# Patient Record
Sex: FEMALE | Race: WHITE | NOT HISPANIC OR LATINO | URBAN - METROPOLITAN AREA
[De-identification: names, ages, dates, MRNs, and addresses within clinical notes are randomized per-mention and may not be internally consistent; named-entity substitution may affect disease eponyms.]

---

## 2018-06-04 ENCOUNTER — OUTPATIENT (OUTPATIENT)
Dept: OUTPATIENT SERVICES | Facility: HOSPITAL | Age: 63
LOS: 1 days | End: 2018-06-04

## 2020-08-31 ENCOUNTER — APPOINTMENT (OUTPATIENT)
Dept: RADIOLOGY | Facility: CLINIC | Age: 65
End: 2020-08-31
Payer: COMMERCIAL

## 2020-08-31 PROCEDURE — 77080 DXA BONE DENSITY AXIAL: CPT

## 2021-01-11 ENCOUNTER — OUTPATIENT (OUTPATIENT)
Dept: OUTPATIENT SERVICES | Facility: HOSPITAL | Age: 66
LOS: 1 days | End: 2021-01-11

## 2021-11-03 PROBLEM — Z00.00 ENCOUNTER FOR PREVENTIVE HEALTH EXAMINATION: Status: ACTIVE | Noted: 2021-11-03

## 2022-01-26 ENCOUNTER — APPOINTMENT (OUTPATIENT)
Dept: OPHTHALMOLOGY | Facility: CLINIC | Age: 67
End: 2022-01-26
Payer: COMMERCIAL

## 2022-01-26 ENCOUNTER — NON-APPOINTMENT (OUTPATIENT)
Age: 67
End: 2022-01-26

## 2022-01-26 PROCEDURE — 92014 COMPRE OPH EXAM EST PT 1/>: CPT

## 2022-01-26 PROCEDURE — 92134 CPTRZ OPH DX IMG PST SGM RTA: CPT

## 2022-02-23 ENCOUNTER — APPOINTMENT (OUTPATIENT)
Dept: OPHTHALMOLOGY | Facility: CLINIC | Age: 67
End: 2022-02-23
Payer: COMMERCIAL

## 2022-02-23 ENCOUNTER — NON-APPOINTMENT (OUTPATIENT)
Age: 67
End: 2022-02-23

## 2022-02-23 PROCEDURE — 92133 CPTRZD OPH DX IMG PST SGM ON: CPT

## 2022-02-23 PROCEDURE — 92083 EXTENDED VISUAL FIELD XM: CPT

## 2022-05-04 ENCOUNTER — APPOINTMENT (OUTPATIENT)
Dept: OPHTHALMOLOGY | Facility: CLINIC | Age: 67
End: 2022-05-04

## 2022-07-15 ENCOUNTER — APPOINTMENT (OUTPATIENT)
Dept: ULTRASOUND IMAGING | Facility: CLINIC | Age: 67
End: 2022-07-15

## 2022-07-15 PROCEDURE — 76536 US EXAM OF HEAD AND NECK: CPT

## 2022-07-19 ENCOUNTER — APPOINTMENT (OUTPATIENT)
Dept: PLASTIC SURGERY | Facility: CLINIC | Age: 67
End: 2022-07-19

## 2022-07-19 VITALS
OXYGEN SATURATION: 99 % | RESPIRATION RATE: 16 BRPM | SYSTOLIC BLOOD PRESSURE: 110 MMHG | TEMPERATURE: 97.6 F | BODY MASS INDEX: 28.12 KG/M2 | WEIGHT: 175 LBS | HEART RATE: 65 BPM | HEIGHT: 66 IN | DIASTOLIC BLOOD PRESSURE: 80 MMHG

## 2022-07-19 DIAGNOSIS — Z78.9 OTHER SPECIFIED HEALTH STATUS: ICD-10-CM

## 2022-07-19 DIAGNOSIS — Z87.891 PERSONAL HISTORY OF NICOTINE DEPENDENCE: ICD-10-CM

## 2022-07-19 DIAGNOSIS — J45.909 UNSPECIFIED ASTHMA, UNCOMPLICATED: ICD-10-CM

## 2022-07-19 DIAGNOSIS — Z87.19 PERSONAL HISTORY OF OTHER DISEASES OF THE DIGESTIVE SYSTEM: ICD-10-CM

## 2022-07-19 DIAGNOSIS — Z82.3 FAMILY HISTORY OF STROKE: ICD-10-CM

## 2022-07-19 DIAGNOSIS — Z82.49 FAMILY HISTORY OF ISCHEMIC HEART DISEASE AND OTHER DISEASES OF THE CIRCULATORY SYSTEM: ICD-10-CM

## 2022-07-19 DIAGNOSIS — Z82.0 FAMILY HISTORY OF EPILEPSY AND OTHER DISEASES OF THE NERVOUS SYSTEM: ICD-10-CM

## 2022-07-19 DIAGNOSIS — Z81.8 FAMILY HISTORY OF OTHER MENTAL AND BEHAVIORAL DISORDERS: ICD-10-CM

## 2022-07-19 DIAGNOSIS — Z80.3 FAMILY HISTORY OF MALIGNANT NEOPLASM OF BREAST: ICD-10-CM

## 2022-07-19 DIAGNOSIS — Z86.19 PERSONAL HISTORY OF OTHER INFECTIOUS AND PARASITIC DISEASES: ICD-10-CM

## 2022-07-19 DIAGNOSIS — Z63.5 DISRUPTION OF FAMILY BY SEPARATION AND DIVORCE: ICD-10-CM

## 2022-07-19 DIAGNOSIS — Z80.42 FAMILY HISTORY OF MALIGNANT NEOPLASM OF PROSTATE: ICD-10-CM

## 2022-07-19 PROCEDURE — 99205 OFFICE O/P NEW HI 60 MIN: CPT

## 2022-07-19 PROCEDURE — G2212 PROLONG OUTPT/OFFICE VIS: CPT

## 2022-07-19 RX ORDER — ALBUTEROL SULFATE 90 UG/1
108 (90 BASE) INHALANT RESPIRATORY (INHALATION)
Refills: 0 | Status: ACTIVE | COMMUNITY

## 2022-07-19 RX ORDER — OXYCODONE AND ACETAMINOPHEN 5; 325 MG/1; MG/1
5-325 TABLET ORAL
Qty: 10 | Refills: 0 | Status: COMPLETED | COMMUNITY
Start: 2022-05-04

## 2022-07-19 RX ORDER — BACLOFEN 10 MG/1
10 TABLET ORAL
Qty: 90 | Refills: 0 | Status: ACTIVE | COMMUNITY
Start: 2022-05-16

## 2022-07-19 RX ORDER — OMEPRAZOLE 40 MG/1
40 CAPSULE, DELAYED RELEASE ORAL
Qty: 90 | Refills: 0 | Status: ACTIVE | COMMUNITY
Start: 2022-05-16

## 2022-07-19 RX ORDER — LINACLOTIDE 290 UG/1
290 CAPSULE, GELATIN COATED ORAL
Refills: 0 | Status: ACTIVE | COMMUNITY

## 2022-07-19 RX ORDER — LETROZOLE TABLETS 2.5 MG/1
2.5 TABLET, FILM COATED ORAL
Refills: 0 | Status: ACTIVE | COMMUNITY

## 2022-07-19 RX ORDER — AZELASTINE HYDROCHLORIDE 137 UG/1
0.1 SPRAY, METERED NASAL
Refills: 0 | Status: ACTIVE | COMMUNITY

## 2022-07-19 RX ORDER — SPIRONOLACTONE 25 MG/1
25 TABLET ORAL
Qty: 90 | Refills: 0 | Status: ACTIVE | COMMUNITY
Start: 2022-05-16

## 2022-07-19 RX ORDER — CLOTRIMAZOLE 10 MG/G
CREAM VAGINAL
Refills: 0 | Status: ACTIVE | COMMUNITY

## 2022-07-19 RX ORDER — CEFADROXIL 500 MG/1
500 CAPSULE ORAL
Qty: 14 | Refills: 0 | Status: COMPLETED | COMMUNITY
Start: 2022-04-26

## 2022-07-19 RX ORDER — TRAMADOL HYDROCHLORIDE 50 MG/1
50 TABLET, COATED ORAL
Qty: 10 | Refills: 0 | Status: COMPLETED | COMMUNITY
Start: 2022-04-26

## 2022-07-19 RX ORDER — MUPIROCIN 20 MG/G
2 OINTMENT TOPICAL
Refills: 0 | Status: ACTIVE | COMMUNITY

## 2022-07-19 RX ORDER — CYCLOBENZAPRINE HYDROCHLORIDE 5 MG/1
5 TABLET, FILM COATED ORAL
Refills: 0 | Status: ACTIVE | COMMUNITY

## 2022-07-19 RX ORDER — MOMETASONE 50 UG/1
SPRAY, METERED NASAL
Refills: 0 | Status: ACTIVE | COMMUNITY

## 2022-07-19 RX ORDER — FLUTICASONE PROPIONATE 50 UG/1
50 SPRAY, METERED NASAL
Qty: 16 | Refills: 0 | Status: ACTIVE | COMMUNITY
Start: 2022-07-08

## 2022-07-19 RX ORDER — PROMETHAZINE HYDROCHLORIDE AND CODEINE PHOSPHATE 6.25; 1 MG/5ML; MG/5ML
6.25-1 SOLUTION ORAL
Qty: 473 | Refills: 0 | Status: COMPLETED | COMMUNITY
Start: 2022-07-12

## 2022-07-19 SDOH — SOCIAL STABILITY - SOCIAL INSECURITY: DISRUPTION OF FAMILY BY SEPARATION AND DIVORCE: Z63.5

## 2022-07-29 NOTE — ASSESSMENT
[FreeTextEntry1] : 66 yo woman who presents after prior lumpectomy and radiation then bilateral mastectomy and implant reconstruction with asymmetry and bilateral deformity. She presents for evaluation for reconstructive options.\par \par We reviewed the spectrum of reconstructive options, including no reconstruction, implant based reconstruction and autologous reconstruction. We discussed the risk/benefit ratio for each of these options.\par \par We reviewed options for addition revision of implant reconstruction, including additional fat grafting and implant exchanges. This course will be limited by the radiation on the left and scarring from tissue necrosis and LD flap on the right. We discussed that implants are not considered lifetime devices, and may need to be replaced in the future. We discussed the risks of infection requiring implant removal, rupture, capsular contracture and implant exposure (especially following radiation).\par \par We discussed at length the R/B/A of autologous reconstruction from the abdomen of the thigh, including, but not limited to complete flap failure, scarring, poor wound healing, bleeding/hematoma, infection, return to OR for attempted salvage. She is aware that abdominal weakness is also a possibility, and she would like to avoid abdominal surgery for this reason. We then pursued additional discussion of the use of bilateral PAP flaps.\par I will discuss this with my colleagues as well, because this surgery will require a second microvascular surgeon.\par She should return for a second consultation to review these options.

## 2022-07-29 NOTE — REASON FOR VISIT
[Consultation] : a consultation visit [FreeTextEntry1] : Patient states she wants a second option for her breast surgery.

## 2022-07-29 NOTE — PHYSICAL EXAM
[NI] : Normal [de-identified] : NL respiratory effort noted  [de-identified] : Bilateral reconstructed breasts with implants in place.\par Capsules are soft.\par Right recon is shifted laterally.\par Both sides appear narrow on her chest wall.\par SQ covered is thin.\par Skin is tighter on the left, radiated side.\par Lower pole is scarred on the right and there is a small dog ear at the lateral aspect of the LD flap.\par Right bra line back scar for LD harvest. [de-identified] : No breasts, s/p bilateral mastectomy.  [de-identified] : Excellent muscle tone.\par Mild adipose and moderate skin excess. [de-identified] : Medial thigh excess skin and adipose. [de-identified] : Dark tan

## 2022-07-29 NOTE — HISTORY OF PRESENT ILLNESS
[FreeTextEntry1] : This is a 68 yo woman who presents for evaluation for reconstructive options after bilateral mastectomy.\par SHe has a history of left lumpectomy sentinel lymph node biopsy as well as postoperative radiation about 17 years ago (approx 2005). This was performed by a Dr. Bib Sanders. She reports she initially felt a mass that lead to the workup for this initial cancer.\par She did have some firmness of the left breast, but otherwise did well until last year when she again felt a mass in the lateral aspect of the left breast. She had already planned a mammogram around that time for surveillance, and was found to have a suspicious lesion on MMG and US. Biopsy again revealed cancer. This time, she opted for bilateral nipple sparing mastectomy (right prophylactic) by Dr. Bergman (she reports she had to change breast surgeons due to insurance).\par Her postoperative course was complicated by inferior pole skin necrosis on the right side and she was taken back to the OR for resection of the necrotic skin and replacement with a flap from her back. 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 The right and left nipples were then resected. \par She had another procedure for right chest wall scar revision and a subsequent procedure for right implant exchange (pt is not sure why). \par In May of this year, she underwent fat grafting from her abdomen to the bilateral reconstructed breasts, but reports that she did not see any improvement.\par \par Of note in her history, she had bilateral breast augmentation by Dr. Santa Hansen about one year before her first cancer diagnosis (approx 2004) and she was very happy with her shape and size at that time (submuscular saline aug 330cc).

## 2022-09-08 ENCOUNTER — APPOINTMENT (OUTPATIENT)
Dept: RADIOLOGY | Facility: CLINIC | Age: 67
End: 2022-09-08

## 2022-09-08 PROCEDURE — 77080 DXA BONE DENSITY AXIAL: CPT

## 2022-09-20 ENCOUNTER — APPOINTMENT (OUTPATIENT)
Dept: PLASTIC SURGERY | Facility: CLINIC | Age: 67
End: 2022-09-20

## 2022-09-20 VITALS
SYSTOLIC BLOOD PRESSURE: 102 MMHG | BODY MASS INDEX: 22.13 KG/M2 | HEART RATE: 65 BPM | OXYGEN SATURATION: 98 % | TEMPERATURE: 97.2 F | HEIGHT: 68 IN | DIASTOLIC BLOOD PRESSURE: 74 MMHG | RESPIRATION RATE: 16 BRPM | WEIGHT: 146 LBS

## 2022-09-20 PROCEDURE — 99215 OFFICE O/P EST HI 40 MIN: CPT

## 2022-09-20 NOTE — ASSESSMENT
[FreeTextEntry1] : 66 yo woman who presents after prior lumpectomy and radiation then bilateral mastectomy and implant reconstruction with asymmetry and bilateral deformity. She presents for a second consultation regarding reconstructive options especially the thigh donor site or PAP flap..\par \par \par We discussed at length the R/B/A of autologous reconstruction from the thigh, including, but not limited to complete flap failure, scarring, poor wound healing, bleeding/hematoma, infection, return to OR for attempted salvage.We discussed the need for MR angiography to evaluate the suitability of the donor sites with regard to the perforating vessels. We discussed the volume that could be obtained with these flaps and the possibility of adding a small implant under the flap to add volume. We discussed the need for a backup plan in the case of flap failure, specifically placement of tissue expander(s).\par We discussed the possibility of additional surgery for fine-tuning of the result, especially fat grafting for any remaining step-off deformities.\par We discussed the limitations of breast reconstruction, especially in the setting of multiple previous surgeries and radiation, and that the reconstructed breasts will not be expected to look and feel like her augmented breasts did prior to mastectomy.\par We discussed scarring and the likelihood of adding a skin paddle on the left or bilaterally, due to prior radiation (left) and scarring (right).\par We discussed the need for scheduling with a second microvascular surgeon and the inability to  confirm that the surgery could be scheduled in time for her to recover prior to her planned travel to [place] on 1/11/23.

## 2022-09-20 NOTE — HISTORY OF PRESENT ILLNESS
[FreeTextEntry1] : Pt returns for a second consultation.\par She reiterates that she is not happy with her results following her reconstructive surgery.\par When pressed for more specifics, she reports that the implant edges are visible, that the implants are too far apart (too lateral) and that the flap tissue on the right side bulges out. She is not sure what her current implant volume is. She believes her original saline implants in 2004 were around 300cc.\par Ideally, she would like her reconstructed breast size to be a bit larger than they are now.

## 2022-09-20 NOTE — REVIEW OF SYSTEMS
[Negative] : Cardiovascular [FreeTextEntry9] : Back pain. Due for MRI on 10/5 to evaluate for spinal stenosis.

## 2022-09-20 NOTE — PHYSICAL EXAM
[NI] : Normal [de-identified] : NL respiratory effort noted  [de-identified] : Bilateral reconstructed breasts with implants in place.\par Capsules are soft.\par Right recon is shifted laterally.\par Both sides appear narrow on her chest wall.\par SQ coverage is thin.\par Skin is tighter on the left, radiated side.\par Lower pole is scarred on the right and there is a small dog ear at the supero-lateral aspect of the LD flap.\par Right bra line back scar from LD harvest. [de-identified] : No breasts, s/p bilateral mastectomy.  [de-identified] : Excellent muscle tone.\par Mild adipose and moderate skin excess. [de-identified] : Medial thigh excess skin and adipose. [de-identified] : Dark tan

## 2022-10-05 ENCOUNTER — APPOINTMENT (OUTPATIENT)
Dept: MRI IMAGING | Facility: CLINIC | Age: 67
End: 2022-10-05

## 2022-10-05 PROCEDURE — 72148 MRI LUMBAR SPINE W/O DYE: CPT

## 2022-10-11 ENCOUNTER — APPOINTMENT (OUTPATIENT)
Dept: MRI IMAGING | Facility: CLINIC | Age: 67
End: 2022-10-11
Payer: MEDICARE

## 2022-10-11 ENCOUNTER — OUTPATIENT (OUTPATIENT)
Dept: OUTPATIENT SERVICES | Facility: HOSPITAL | Age: 67
LOS: 1 days | End: 2022-10-11
Payer: MEDICARE

## 2022-10-11 DIAGNOSIS — Z42.1 ENCOUNTER FOR BREAST RECONSTRUCTION FOLLOWING MASTECTOMY: ICD-10-CM

## 2022-10-11 PROCEDURE — A9585: CPT

## 2022-10-11 PROCEDURE — C8914: CPT

## 2022-10-11 PROCEDURE — 73725 MR ANG LWR EXT W OR W/O DYE: CPT | Mod: 26,50

## 2022-10-13 ENCOUNTER — NON-APPOINTMENT (OUTPATIENT)
Age: 67
End: 2022-10-13

## 2022-11-04 ENCOUNTER — NON-APPOINTMENT (OUTPATIENT)
Age: 67
End: 2022-11-04

## 2022-11-07 ENCOUNTER — APPOINTMENT (OUTPATIENT)
Dept: PLASTIC SURGERY | Facility: CLINIC | Age: 67
End: 2022-11-07

## 2022-11-07 VITALS
DIASTOLIC BLOOD PRESSURE: 78 MMHG | HEIGHT: 68 IN | SYSTOLIC BLOOD PRESSURE: 100 MMHG | RESPIRATION RATE: 16 BRPM | TEMPERATURE: 97.3 F | BODY MASS INDEX: 23.34 KG/M2 | HEART RATE: 61 BPM | WEIGHT: 154 LBS | OXYGEN SATURATION: 99 %

## 2022-11-07 DIAGNOSIS — N65.0 DEFORMITY OF RECONSTRUCTED BREAST: ICD-10-CM

## 2022-11-07 DIAGNOSIS — Z85.3 PERSONAL HISTORY OF MALIGNANT NEOPLASM OF BREAST: ICD-10-CM

## 2022-11-07 PROCEDURE — 99215 OFFICE O/P EST HI 40 MIN: CPT

## 2022-11-07 NOTE — ASSESSMENT
[FreeTextEntry1] : We discussed the difficulty of finding enough tissue to perform adequate autologous flap reconstruction without adding undue complexity and limiting the number of microsurgeons available to perform those procedures (may require travel). Because of this, she is willing to consider revision of the reconstruction with implants, capsule work and fat grafting.\par She is also due for a left breast and axilla US today as her PCP found a small lump in her axilla. Clinically, this is consistent with an inclusion cyst. She will point out the firm area on the reconstructed breast as well, so that it can be assessed with US.\par The plan would be for the following:\par -Left capsulotomy with possible excision of the firm UOQ area, pending the US results.\par -Right medial capsulotomy and lateral capsulorrhaphy to medialize the implant.\par -Bilateral implant exchange for a wider base width to accommodate her chest wall.\par -Bilateral fat grafting to address upper pole defects and medial soft tissue deficit bilaterally.\par -Excision of right UOQ skin and fat excess.\par \par We reviewed the risks, benefits and alternatives to fat grafting, including, but not limited to, infection of the implant requiring removal, fat necrosis resembling a breast mass and oil cysts. We also reviewed the soreness and bruising expected at the fat harvest (liposuction) sites. We reviewed the possibility of contour deformity at the harvest sites. I also discussed the timeline for fat graft take, and that it will take 2-3 months to see how much of the fat will live in its new location. Additional sessions of fat grafting may be required.\par \par We discussed implant risks and went over the [place] silicone implant consent checklist page by page. In summary, we discussed that implants are not lifetime devices. There are risks of capsular contracture (higher after radiation), implant rupture, breast implant associated lymphoma (JOSETTE-ALCL), infection requiring removal and poor position and/or contour. We discussed systemic symptoms that may be associated with breast implants that may or may not resolve with removal. \par \par She was given an opportunity to ask questions, and all of her questions were answered. She wishes to proceed.\par I offered her a surgical date in late November, but she reports she is hosting a party that day and cannot change the date. We will let her know what dates we may have available in December.

## 2022-11-07 NOTE — PHYSICAL EXAM
[NI] : Normal [de-identified] : NL respiratory effort noted  [de-identified] : Bilateral reconstructed breasts with implants in place.\par Capsules are soft, except for one area in the UOQ on the left. There is a firm, disc-like mass about 3.5 x 2.5 cm with well-defined edges.\par It is not tender and is very flat.\par Right recon is shifted laterally.\par Both sides appear narrow on her chest wall.\par SQ coverage is thin.\par Skin is tighter on the left, radiated side.\par Lower pole is scarred on the right and there is a small dog ear at the supero-lateral aspect of the LD flap.\par Right bra line back scar from LD harvest.\par Bilateral upper pole soft tissue deficit creating a step-off deformity. [de-identified] : No breasts, s/p bilateral mastectomy. \par Left axilla with a 4 mm superficial lump, clinically suspicious for an inclusion cyst. [de-identified] : Excellent muscle tone.\par Mild adipose and moderate skin excess. [de-identified] : Medial thigh excess skin and adipose. [de-identified] : Dark tan

## 2022-11-07 NOTE — HISTORY OF PRESENT ILLNESS
[FreeTextEntry1] : Pt presents to discuss other, non-flap, surgical options for revision of her breast reconstruction.\par She would be OK with the left side, especially is more fat grafting could be performed medially, but she is most upset about the lateral displacement of the right implant and the soft tissue excess on the lateral aspect on the right.\par She has also noticed a flat, hard area in the left reconstructed breast.

## 2022-11-07 NOTE — REASON FOR VISIT
[Follow-Up: _____] : a [unfilled] follow-up visit [FreeTextEntry1] : Patient states she feels good, she would like to discuss about options for surgery.

## 2022-11-14 ENCOUNTER — NON-APPOINTMENT (OUTPATIENT)
Age: 67
End: 2022-11-14

## 2022-11-25 ENCOUNTER — APPOINTMENT (OUTPATIENT)
Dept: MRI IMAGING | Facility: CLINIC | Age: 67
End: 2022-11-25

## 2022-11-25 PROCEDURE — A9585: CPT

## 2022-11-25 PROCEDURE — 77049 MRI BREAST C-+ W/CAD BI: CPT

## 2022-12-03 ENCOUNTER — NON-APPOINTMENT (OUTPATIENT)
Age: 67
End: 2022-12-03

## 2022-12-23 ENCOUNTER — NON-APPOINTMENT (OUTPATIENT)
Age: 67
End: 2022-12-23

## 2022-12-23 ENCOUNTER — APPOINTMENT (OUTPATIENT)
Dept: PLASTIC SURGERY | Facility: HOSPITAL | Age: 67
End: 2022-12-23
Payer: MEDICARE

## 2022-12-23 PROCEDURE — ZZZZZ: CPT

## 2022-12-23 PROCEDURE — 88300 SURGICAL PATH GROSS: CPT | Mod: 26,59

## 2022-12-23 PROCEDURE — 88305 TISSUE EXAM BY PATHOLOGIST: CPT | Mod: 26

## 2022-12-23 PROCEDURE — 88302 TISSUE EXAM BY PATHOLOGIST: CPT | Mod: 26

## 2022-12-27 ENCOUNTER — NON-APPOINTMENT (OUTPATIENT)
Age: 67
End: 2022-12-27

## 2022-12-28 ENCOUNTER — APPOINTMENT (OUTPATIENT)
Dept: PLASTIC SURGERY | Facility: CLINIC | Age: 67
End: 2022-12-28

## 2022-12-28 VITALS
HEART RATE: 90 BPM | OXYGEN SATURATION: 100 % | SYSTOLIC BLOOD PRESSURE: 118 MMHG | RESPIRATION RATE: 16 BRPM | BODY MASS INDEX: 23.34 KG/M2 | HEIGHT: 68 IN | DIASTOLIC BLOOD PRESSURE: 80 MMHG | WEIGHT: 154 LBS | TEMPERATURE: 98.2 F

## 2022-12-28 PROCEDURE — 99024 POSTOP FOLLOW-UP VISIT: CPT

## 2022-12-28 NOTE — HISTORY OF PRESENT ILLNESS
[FreeTextEntry1] : Pt reports she is very happy with the results, especially the size and superior pole fullness.\par Had some irritation around the tegaderm at a few sites, especially under the breasts.

## 2022-12-28 NOTE — ASSESSMENT
[FreeTextEntry1] : Doing well postop.\par continue abx until finished.\par I cautioned pt that she still has swelling and the upper pole fullness will not be maintained to this degree.\par \par She is planning on leaving for [place] on 1/11 (despite caution preoperatively that this may be too soon for proper follow up), but will be back the last week of February. I have advised her to follow up when she returns, but to let me know of any changes in the meantime and that if there are any issues, she may have to change her plans.\par

## 2022-12-28 NOTE — PHYSICAL EXAM
[NI] : Normal [de-identified] : NL respiratory effort noted  [de-identified] : Bilateral recon breasts with edema and mild ecchymosis.\par Incisions intact with steristrips in place.\par Mild rash/dermatitis at both IMFs. [de-identified] : No breasts, s/p bilateral mastectomy.  [de-identified] : Bilateral flank ecchymosis [de-identified] : Bilateral lateral thigh ecchymosis.\par Minimal to no medial thigh ecchymosis.

## 2022-12-28 NOTE — REASON FOR VISIT
[Follow-Up: _____] : a [unfilled] follow-up visit [FreeTextEntry1] : Patient states she is feeling sore which is expected.

## 2023-01-13 ENCOUNTER — NON-APPOINTMENT (OUTPATIENT)
Age: 68
End: 2023-01-13

## 2023-02-21 ENCOUNTER — APPOINTMENT (OUTPATIENT)
Dept: PLASTIC SURGERY | Facility: CLINIC | Age: 68
End: 2023-02-21
Payer: MEDICARE

## 2023-02-21 VITALS
RESPIRATION RATE: 16 BRPM | WEIGHT: 154 LBS | DIASTOLIC BLOOD PRESSURE: 80 MMHG | HEIGHT: 68 IN | BODY MASS INDEX: 23.34 KG/M2 | HEART RATE: 79 BPM | SYSTOLIC BLOOD PRESSURE: 100 MMHG | TEMPERATURE: 98 F | OXYGEN SATURATION: 98 %

## 2023-02-21 DIAGNOSIS — Z09 ENCOUNTER FOR FOLLOW-UP EXAMINATION AFTER COMPLETED TREATMENT FOR CONDITIONS OTHER THAN MALIGNANT NEOPLASM: ICD-10-CM

## 2023-02-21 PROCEDURE — 99024 POSTOP FOLLOW-UP VISIT: CPT

## 2023-02-21 RX ORDER — CEPHALEXIN 500 MG/1
500 TABLET ORAL 3 TIMES DAILY
Qty: 21 | Refills: 0 | Status: DISCONTINUED | COMMUNITY
Start: 2022-12-15 | End: 2023-02-21

## 2023-02-21 RX ORDER — OXYCODONE 5 MG/1
5 TABLET ORAL EVERY 6 HOURS
Qty: 5 | Refills: 0 | Status: DISCONTINUED | COMMUNITY
Start: 2022-12-15 | End: 2023-02-21

## 2023-02-21 NOTE — ASSESSMENT
[FreeTextEntry1] : Doing well s/p implant exchange, revision of recon and fat grafting.\par Will plan NAC recon next.\par I advised pt that the left breast skin is stiff from the radiation and the coverage is still fairly thin, so creation of nipple flaps for projection may be problematic on that side.\par She has opted for a tattoo only reconstruction, with just the visual representation of the nipple within the tattooed areola.\par She is leaving again for [place] in March, so we will plan for sometime in May.

## 2023-02-21 NOTE — PHYSICAL EXAM
[NI] : Normal [de-identified] : NL respiratory effort noted  [de-identified] : Bilateral recon breasts with no edema.\par Fat grafts with good take.\par Implants soft.\par  [de-identified] : No breasts, s/p bilateral mastectomy.

## 2023-02-21 NOTE — REASON FOR VISIT
[Follow-Up: _____] : a [unfilled] follow-up visit [FreeTextEntry1] : Patient states she is doing well, she has no concerns.

## 2023-02-21 NOTE — HISTORY OF PRESENT ILLNESS
[FreeTextEntry1] : Pt reports she did go to [place] and was very active.\par She had to come back early to take care of her grandchild.\par She is feeling well and is happy with her results.\par She feels the breasts are more natural.\par She does use a sports bra to bring them more medially.

## 2023-05-18 ENCOUNTER — APPOINTMENT (OUTPATIENT)
Dept: PLASTIC SURGERY | Facility: CLINIC | Age: 68
End: 2023-05-18
Payer: MEDICARE

## 2023-05-18 VITALS
OXYGEN SATURATION: 98 % | DIASTOLIC BLOOD PRESSURE: 80 MMHG | BODY MASS INDEX: 22.58 KG/M2 | WEIGHT: 149 LBS | TEMPERATURE: 98.3 F | HEART RATE: 88 BPM | SYSTOLIC BLOOD PRESSURE: 118 MMHG | HEIGHT: 68 IN | RESPIRATION RATE: 16 BRPM

## 2023-05-18 PROCEDURE — 11921 CORRECT SKN COLOR 6.1-20.0CM: CPT

## 2023-05-18 PROCEDURE — 11922 CORRECT SKIN COLOR EA 20.0CM: CPT

## 2023-05-18 NOTE — PROCEDURE
[FreeTextEntry1] : Acquired absence of bilateral breasts [FreeTextEntry2] : Bilateral nipple areolar complex reconstruction with tattoos, 28 cm2 [FreeTextEntry3] : Lidocaine topical gel [FreeTextEntry4] : None [FreeTextEntry5] : None [FreeTextEntry6] : The skin was assessed and appears in good condition.\par The outer areolar diameter was drawn at   44 mm and the nipple diameter at 11 mm (surface area of 13.8 cm2 on each side, 27.7 cm2 total).\par Pigment colors were mixed as needed and tested on the skin.\par Both reconstructed breasts were prepared with chlorhexidine.\par The 9 magnum needle was used to outline the nipple and areolar borders (pigment mix of Areola 2A for the areola).\par Magic gel lidocaine local anesthetic was then applied.\par The areolae were then filled in using the above mix.\par The nipple was shaded (pigment mix of equal parts Areola 5, 3 and 6A) with the 3 liner needle leaving a superomedial highlight.\par The Lilly glands were applied using white pigment with nipple pigment highlights using the 3 liner needle. Additional texture highlights were created using the nipple pigment mix and the 3 liner needle.\par \par The area was then cleansed and dressed with petrolatum ointment, gauze and tegaderm.\par Pt tolerated the procedure well. \par \par Pt may shower in two days, then apply antibiotic ointment and cover with gauze in bra daily.\par Follow up in 1-2 weeks.

## 2023-05-18 NOTE — REASON FOR VISIT
[Procedure: _________] : a [unfilled] procedure visit [FreeTextEntry1] : Patient states she feels great and has no issues to report.

## 2023-05-24 ENCOUNTER — APPOINTMENT (OUTPATIENT)
Dept: PLASTIC SURGERY | Facility: CLINIC | Age: 68
End: 2023-05-24
Payer: MEDICARE

## 2023-05-24 VITALS
DIASTOLIC BLOOD PRESSURE: 68 MMHG | RESPIRATION RATE: 14 BRPM | HEART RATE: 65 BPM | HEIGHT: 68 IN | BODY MASS INDEX: 22.43 KG/M2 | SYSTOLIC BLOOD PRESSURE: 102 MMHG | OXYGEN SATURATION: 99 % | TEMPERATURE: 97.7 F | WEIGHT: 148 LBS

## 2023-05-24 PROCEDURE — 99212 OFFICE O/P EST SF 10 MIN: CPT

## 2023-05-24 NOTE — PHYSICAL EXAM
[de-identified] : Bilateral nipple areolar  complexes are healing well, no evidence of infection.  there are some small areas of dry tissue bilaterally and the color is a bit light pink [de-identified] : no breasts, s/p bilateral mastectomies

## 2023-05-24 NOTE — ASSESSMENT
[FreeTextEntry1] : 68 y/o female for follow up of NAC tattooing \par \par Healing well\par follow up in 3 months to reevaluate color of tattoos \par all questions and concerns addressed \par plan and patient status discussed with Dr Guevara

## 2023-05-24 NOTE — REASON FOR VISIT
[Follow-Up: _____] : a [unfilled] follow-up visit [FreeTextEntry1] : s/p NAC tattooing done 5/18/23.  Patient reports no issues

## 2023-05-24 NOTE — HISTORY OF PRESENT ILLNESS
[FreeTextEntry1] : Patient presents for follow up after having nipple areolar complex tattooing performed at last visit.  She states she thinks the color is a bit lighter on the right and darker on the left, but has been applying aquaphor to the areas daily.  She has no other complaints.

## 2023-07-11 ENCOUNTER — APPOINTMENT (OUTPATIENT)
Dept: ULTRASOUND IMAGING | Facility: CLINIC | Age: 68
End: 2023-07-11
Payer: MEDICARE

## 2023-07-11 PROCEDURE — 76882 US LMTD JT/FCL EVL NVASC XTR: CPT | Mod: LT

## 2023-07-13 ENCOUNTER — APPOINTMENT (OUTPATIENT)
Dept: ULTRASOUND IMAGING | Facility: CLINIC | Age: 68
End: 2023-07-13

## 2023-07-27 ENCOUNTER — APPOINTMENT (OUTPATIENT)
Dept: CT IMAGING | Facility: CLINIC | Age: 68
End: 2023-07-27
Payer: MEDICARE

## 2023-07-27 PROCEDURE — 70486 CT MAXILLOFACIAL W/O DYE: CPT

## 2023-07-28 ENCOUNTER — NON-APPOINTMENT (OUTPATIENT)
Age: 68
End: 2023-07-28

## 2023-07-28 ENCOUNTER — APPOINTMENT (OUTPATIENT)
Dept: OPHTHALMOLOGY | Facility: CLINIC | Age: 68
End: 2023-07-28
Payer: MEDICARE

## 2023-07-28 PROCEDURE — 92133 CPTRZD OPH DX IMG PST SGM ON: CPT

## 2023-07-28 PROCEDURE — ZZZZZ: CPT

## 2023-07-28 PROCEDURE — 92004 COMPRE OPH EXAM NEW PT 1/>: CPT

## 2023-08-23 ENCOUNTER — APPOINTMENT (OUTPATIENT)
Dept: PLASTIC SURGERY | Facility: CLINIC | Age: 68
End: 2023-08-23
Payer: MEDICARE

## 2023-08-23 VITALS
HEIGHT: 68 IN | OXYGEN SATURATION: 97 % | SYSTOLIC BLOOD PRESSURE: 124 MMHG | TEMPERATURE: 97.9 F | WEIGHT: 142 LBS | DIASTOLIC BLOOD PRESSURE: 70 MMHG | HEART RATE: 68 BPM | RESPIRATION RATE: 14 BRPM | BODY MASS INDEX: 21.52 KG/M2

## 2023-08-23 PROCEDURE — 99213 OFFICE O/P EST LOW 20 MIN: CPT

## 2023-08-23 NOTE — ASSESSMENT
HISTORY OF PRESENT ILLNESS  Darlene Huang is a 24 y.o. female. HPI Comments: Presents to establish care. She has no health problems, takes no medications, and doesn't smoke. She believes her shots are up to date. Concerned today about her recent upper respiratory illness. She describes \"a bad cold\" that began 6 days ago, with cough, nasal congestion, sore throat, myalgias. No fever, GI symptoms. All of her symptoms have resolved, but she is not allowed to return to work () without a note. She will get a flu shot today. Cold   Pertinent negatives include no chest pain, no headaches and no shortness of breath. Establish Care   Pertinent negatives include no chest pain, no headaches and no shortness of breath. History reviewed. No pertinent past medical history. History reviewed. No pertinent surgical history. History   Smoking Status    Never Smoker   Smokeless Tobacco    Never Used     No current outpatient prescriptions on file. No current facility-administered medications for this visit. Review of Systems   Constitutional: Negative for chills, fever and weight loss. HENT: Negative for congestion, hearing loss and sore throat. Eyes: Negative for blurred vision and double vision. Respiratory: Negative for cough and shortness of breath. Cardiovascular: Negative for chest pain, palpitations and leg swelling. Gastrointestinal: Negative for heartburn, nausea and vomiting. Genitourinary: Negative for dysuria, frequency and urgency. Musculoskeletal: Negative for back pain, myalgias and neck pain. Skin: Negative for itching and rash. Neurological: Negative for dizziness, tingling, focal weakness and headaches. Endo/Heme/Allergies: Negative for environmental allergies. Psychiatric/Behavioral: Negative for depression. The patient is not nervous/anxious and does not have insomnia.       Visit Vitals    /71    Pulse 75    Temp 97.4 °F (36.3 °C)    Resp 12 [FreeTextEntry1] : Bilateral tattoos faded. Pt is scheduled for tattoo revision next week. Continue workup for this inflammatory process.   5' 5\" (1.651 m)    Wt 168 lb (76.2 kg)    LMP 02/05/2018    SpO2 98%    BMI 27.96 kg/m2       Physical Exam   Constitutional: She is oriented to person, place, and time. She appears well-developed and well-nourished. No distress. HENT:   Right Ear: Tympanic membrane, external ear and ear canal normal.   Left Ear: Tympanic membrane, external ear and ear canal normal.   Nose: Mucosal edema present. Mouth/Throat: Oropharynx is clear and moist.   Eyes: Conjunctivae and EOM are normal. Pupils are equal, round, and reactive to light. Neck: Neck supple. No thyromegaly present. Cardiovascular: Normal rate, regular rhythm and intact distal pulses. Exam reveals no gallop and no friction rub. No murmur heard. Pulmonary/Chest: Effort normal and breath sounds normal. No respiratory distress. Abdominal: Soft. She exhibits no mass. There is no tenderness. Musculoskeletal: She exhibits no edema. Lymphadenopathy:     She has no cervical adenopathy. Neurological: She is alert and oriented to person, place, and time. She has normal reflexes. No cranial nerve deficit. Skin: Skin is warm and dry. Psychiatric: She has a normal mood and affect. Her behavior is normal. Judgment and thought content normal.       ASSESSMENT and PLAN    ICD-10-CM ICD-9-CM    1. Encounter for immunization Z23 V03.89 INFLUENZA VIRUS VAC QUAD,SPLIT,PRESV FREE SYRINGE IM   2. Viral URI J06.9 465.9     B97.89       Follow-up Disposition:  Return if symptoms worsen or fail to improve. Will obtain her immunization records, and update as needed. Flu shot today. Given letter for work. Plan of care reviewed - patient verbalize(s) understanding and agreement.

## 2023-08-23 NOTE — PHYSICAL EXAM
[NI] : Normal [de-identified] : NL respiratory effort noted  [de-identified] : Bilateral recon breasts with no edema. Fat grafts with good take. Implants soft. No erythema, induration or edema. No evidence of capsular contracture. Tattoos have faded significantly. The details on the right are clear, but the base color is nearly gone. Both the base and details have faded on the left. [de-identified] : No breasts, s/p bilateral mastectomy.

## 2023-08-23 NOTE — HISTORY OF PRESENT ILLNESS
[FreeTextEntry1] : Pt reports she is being worked up for an unknown febrile illness associated with sacroiliac joint pain, fatigue, high platelet counts as well as multiple antibiotic courses for sinus infections and post-nasal drip. She presents here for evaluation of her NAC tattoos that have faded.

## 2023-08-23 NOTE — REASON FOR VISIT
[Follow-Up: _____] : a [unfilled] follow-up visit [FreeTextEntry1] : Patient states her nipple tattoos need darkening but overall, they look good.

## 2023-08-29 ENCOUNTER — APPOINTMENT (OUTPATIENT)
Dept: RADIOLOGY | Facility: CLINIC | Age: 68
End: 2023-08-29
Payer: MEDICARE

## 2023-08-29 PROCEDURE — 73522 X-RAY EXAM HIPS BI 3-4 VIEWS: CPT

## 2023-08-29 PROCEDURE — 72100 X-RAY EXAM L-S SPINE 2/3 VWS: CPT

## 2023-08-29 PROCEDURE — 72200 X-RAY EXAM SI JOINTS: CPT

## 2023-08-31 ENCOUNTER — APPOINTMENT (OUTPATIENT)
Dept: PLASTIC SURGERY | Facility: CLINIC | Age: 68
End: 2023-08-31
Payer: MEDICARE

## 2023-08-31 VITALS
BODY MASS INDEX: 21.52 KG/M2 | DIASTOLIC BLOOD PRESSURE: 70 MMHG | WEIGHT: 142 LBS | HEIGHT: 68 IN | TEMPERATURE: 97.8 F | SYSTOLIC BLOOD PRESSURE: 110 MMHG | HEART RATE: 69 BPM | RESPIRATION RATE: 14 BRPM | OXYGEN SATURATION: 98 %

## 2023-08-31 DIAGNOSIS — Z42.1 ENCOUNTER FOR BREAST RECONSTRUCTION FOLLOWING MASTECTOMY: ICD-10-CM

## 2023-08-31 PROCEDURE — 11922 CORRECT SKIN COLOR EA 20.0CM: CPT

## 2023-08-31 PROCEDURE — 11921 CORRECT SKN COLOR 6.1-20.0CM: CPT

## 2023-08-31 NOTE — REASON FOR VISIT
[Procedure: _________] : a [unfilled] procedure visit [FreeTextEntry1] : Patient states she feels tired no issues to report.

## 2023-08-31 NOTE — PROCEDURE
[Nl] : None [FreeTextEntry1] : Acquired absence of bilateral breasts, failure nipple areolar tattoos [FreeTextEntry2] : Revision nipple areolar tattoo of bilateral reconstructed breasts, 28 cm2 [FreeTextEntry3] : 'Magic gel' lidocaine [FreeTextEntry6] : Pt with bilateral NAC tattoos faded to nearly imperceptible, especially on the left. The outer areolar diameter was redrawn at 44 mm and the nipple diameter at 11 mm (surface area of 13.8 cm2 on each side, 27.7 cm2 total).  Both reconstructed breasts were prepared with chlorhexidine.  The 9 magnum needle was used to outline the nipple and areolar borders (pigment mix of Areola 6A for the areola, not 2 A as previously used).  Magic gel lidocaine local anesthetic was then applied. The areolae were then filled in using the above mix.  The nipple was shaded (pigment mix of equal parts Areola 5, 3 and 6A) with the 3 liner needle leaving a superomedial highlight.  The Lilly glands were applied using white pigment with nipple pigment highlights using the 3 liner needle. Additional texture highlights were created using the nipple pigment mix and the 3 liner needle.  The area was then cleansed and dressed with petrolatum ointment, gauze and tegaderm.  Pt tolerated the procedure well. Pt may shower in two days, then apply antibiotic ointment and cover with gauze in bra daily.  Follow up in 1-2 weeks.

## 2023-09-06 ENCOUNTER — APPOINTMENT (OUTPATIENT)
Dept: PLASTIC SURGERY | Facility: CLINIC | Age: 68
End: 2023-09-06
Payer: MEDICARE

## 2023-09-06 VITALS
HEART RATE: 82 BPM | BODY MASS INDEX: 21.52 KG/M2 | HEIGHT: 68 IN | DIASTOLIC BLOOD PRESSURE: 80 MMHG | SYSTOLIC BLOOD PRESSURE: 118 MMHG | WEIGHT: 142 LBS | TEMPERATURE: 97.3 F | RESPIRATION RATE: 14 BRPM | OXYGEN SATURATION: 99 %

## 2023-09-06 PROCEDURE — 99212 OFFICE O/P EST SF 10 MIN: CPT

## 2023-09-06 NOTE — REASON FOR VISIT
[Follow-Up: _____] : a [unfilled] follow-up visit [FreeTextEntry1] : Patient states tattoos are looking good no issues to report.

## 2023-09-06 NOTE — ASSESSMENT
[FreeTextEntry1] : Doing well s/p tattoos, but pigment may still not be fully retained. Continue Aquaphor until all peeling skin is gone, then regular skin moisturizer. Follow up in one month.

## 2023-09-06 NOTE — PHYSICAL EXAM
[NI] : Normal [de-identified] : NL respiratory effort noted  [de-identified] : Bilateral recon breasts with no edema. Tattoos peeling with pink skin beneath. Color still does not appear that deep. [de-identified] : No breasts, s/p bilateral mastectomy.

## 2023-10-04 ENCOUNTER — APPOINTMENT (OUTPATIENT)
Dept: PLASTIC SURGERY | Facility: CLINIC | Age: 68
End: 2023-10-04
Payer: MEDICARE

## 2023-10-04 VITALS
DIASTOLIC BLOOD PRESSURE: 70 MMHG | TEMPERATURE: 97.8 F | BODY MASS INDEX: 21.22 KG/M2 | HEART RATE: 85 BPM | SYSTOLIC BLOOD PRESSURE: 110 MMHG | HEIGHT: 68 IN | OXYGEN SATURATION: 98 % | RESPIRATION RATE: 14 BRPM | WEIGHT: 140 LBS

## 2023-10-04 PROCEDURE — 99212 OFFICE O/P EST SF 10 MIN: CPT

## 2023-11-21 ENCOUNTER — APPOINTMENT (OUTPATIENT)
Dept: ULTRASOUND IMAGING | Facility: CLINIC | Age: 68
End: 2023-11-21
Payer: MEDICARE

## 2023-11-21 PROCEDURE — 76700 US EXAM ABDOM COMPLETE: CPT

## 2024-01-18 ENCOUNTER — APPOINTMENT (OUTPATIENT)
Dept: RADIOLOGY | Facility: CLINIC | Age: 69
End: 2024-01-18

## 2024-01-18 ENCOUNTER — APPOINTMENT (OUTPATIENT)
Dept: ULTRASOUND IMAGING | Facility: CLINIC | Age: 69
End: 2024-01-18
Payer: MEDICARE

## 2024-01-18 PROCEDURE — 76642 ULTRASOUND BREAST LIMITED: CPT | Mod: LT

## 2024-01-18 PROCEDURE — 71046 X-RAY EXAM CHEST 2 VIEWS: CPT

## 2024-02-20 ENCOUNTER — APPOINTMENT (OUTPATIENT)
Dept: PLASTIC SURGERY | Facility: CLINIC | Age: 69
End: 2024-02-20
Payer: MEDICARE

## 2024-02-20 VITALS
BODY MASS INDEX: 20.18 KG/M2 | DIASTOLIC BLOOD PRESSURE: 62 MMHG | HEART RATE: 75 BPM | WEIGHT: 133.19 LBS | HEIGHT: 68 IN | OXYGEN SATURATION: 99 % | SYSTOLIC BLOOD PRESSURE: 106 MMHG

## 2024-02-20 DIAGNOSIS — Z90.13 ACQUIRED ABSENCE OF BILATERAL BREASTS AND NIPPLES: ICD-10-CM

## 2024-02-20 PROCEDURE — 99213 OFFICE O/P EST LOW 20 MIN: CPT

## 2024-02-20 NOTE — PHYSICAL EXAM
[NI] : Normal [de-identified] : NL respiratory effort noted  [de-identified] : Bilateral recon breasts with no edema. Tattoos well healed. Color is better. Multiple BB size nodules in the UOQ/axillary fat pad on the left.  Medially on the left there is a mobile, smooth, small approx 3 mm mass. [de-identified] : No breasts, s/p bilateral mastectomy.

## 2024-02-20 NOTE — REASON FOR VISIT
[Follow-Up: _____] : a [unfilled] follow-up visit [FreeTextEntry1] : patient states she had the tattoo procedure done in 08/2023. She says everything has healed up nicely and no issues.

## 2024-02-20 NOTE — ASSESSMENT
[FreeTextEntry1] : Left recon breast with cysts after fat grafting, unchanged. Will continue to follow. Return in 6 months, then yearly for implant surveillance. We discussed long term implant surveillance with MRI or ultrasound, beginning 5-6 year after implant placement, and every 2-3 years after that. Her implant exchange and fat grafting were on 12/23/22 . I will plan on seeing her yearly and scheduling MRI or US in 2027 or 2028.

## 2024-02-20 NOTE — HISTORY OF PRESENT ILLNESS
[FreeTextEntry1] : Pt reports her immune markers are all coming down.  Now just has severe lower back pain. It got much better after a Medrol dose pack, but returned once the meds were done. She is due to have an epidural injection with Dr. Zamora next week.

## 2024-04-17 ENCOUNTER — APPOINTMENT (OUTPATIENT)
Dept: MRI IMAGING | Facility: CLINIC | Age: 69
End: 2024-04-17
Payer: MEDICARE

## 2024-04-17 PROCEDURE — 72148 MRI LUMBAR SPINE W/O DYE: CPT

## 2024-05-01 ENCOUNTER — TRANSCRIPTION ENCOUNTER (OUTPATIENT)
Age: 69
End: 2024-05-01

## 2024-08-20 ENCOUNTER — APPOINTMENT (OUTPATIENT)
Dept: PLASTIC SURGERY | Facility: CLINIC | Age: 69
End: 2024-08-20
Payer: MEDICARE

## 2024-08-20 DIAGNOSIS — Z90.13 ACQUIRED ABSENCE OF BILATERAL BREASTS AND NIPPLES: ICD-10-CM

## 2024-08-20 DIAGNOSIS — N65.0 DEFORMITY OF RECONSTRUCTED BREAST: ICD-10-CM

## 2024-08-20 PROCEDURE — 99213 OFFICE O/P EST LOW 20 MIN: CPT

## 2024-08-20 NOTE — ASSESSMENT
[FreeTextEntry1] : 70 y/o female for follow up  Healed well Follow up in December 2024 for the yearly visit. Implant surveillance to begin in [id number] with imaging. All questions and concerns addressed. Patient is given the option to touch up tattoos if desired in the future. Plan and patient status as per Dr Guevara.

## 2024-08-20 NOTE — PHYSICAL EXAM
[de-identified] : Implants in place bilaterally.  Right side is more lateralized.  Soft, nontender. All scars are soft and faded. NAC tattoos are a light pink.  [de-identified] : no breasts, s/p bilateral mastectomy

## 2024-08-20 NOTE — ADDENDUM
[FreeTextEntry1] : All medical record entries were at my direction and personally dictated by me (Dr. Milly Guevara). I have reviewed the chart and agree that the record accurately reflects my personal performance of the history, physical exam, assessment and plan. Right implant remains more lateral than the left. Still some rippling and mild animation. Continue follow up as above. We discussed revision with capsule work, upsizing and fat grafting if symptoms become problematic.

## 2024-08-20 NOTE — HISTORY OF PRESENT ILLNESS
[FreeTextEntry1] : Patient presents for follow up and states that she is doing well and has no complaints today.  She states it has been 6 months since her last visit and 1 year since her tattoos.  She states that pigments have faded a little, but she is happy with them.  She states that she wishes her implants were "closer together" and that the right one is a bit tighter and higher under her armpit.  She states occasionally she has discomfort. She also inquired about getting bigger implants but states that she is not ready yet for them.   No other complaints.

## 2024-09-24 ENCOUNTER — APPOINTMENT (OUTPATIENT)
Dept: ULTRASOUND IMAGING | Facility: CLINIC | Age: 69
End: 2024-09-24
Payer: MEDICARE

## 2024-09-24 ENCOUNTER — APPOINTMENT (OUTPATIENT)
Dept: RADIOLOGY | Facility: CLINIC | Age: 69
End: 2024-09-24

## 2024-09-24 PROCEDURE — 77080 DXA BONE DENSITY AXIAL: CPT

## 2024-09-24 PROCEDURE — 76536 US EXAM OF HEAD AND NECK: CPT

## 2024-10-04 ENCOUNTER — NON-APPOINTMENT (OUTPATIENT)
Age: 69
End: 2024-10-04

## 2024-10-04 ENCOUNTER — APPOINTMENT (OUTPATIENT)
Dept: OPHTHALMOLOGY | Facility: CLINIC | Age: 69
End: 2024-10-04
Payer: MEDICARE

## 2024-10-04 PROCEDURE — 99214 OFFICE O/P EST MOD 30 MIN: CPT

## 2024-10-04 PROCEDURE — 92133 CPTRZD OPH DX IMG PST SGM ON: CPT

## 2024-10-22 ENCOUNTER — APPOINTMENT (OUTPATIENT)
Dept: ULTRASOUND IMAGING | Facility: CLINIC | Age: 69
End: 2024-10-22
Payer: MEDICARE

## 2024-10-22 PROCEDURE — 93971 EXTREMITY STUDY: CPT | Mod: RT

## 2024-12-17 ENCOUNTER — APPOINTMENT (OUTPATIENT)
Dept: PLASTIC SURGERY | Facility: CLINIC | Age: 69
End: 2024-12-17
Payer: MEDICARE

## 2024-12-17 VITALS
TEMPERATURE: 97.3 F | HEART RATE: 85 BPM | OXYGEN SATURATION: 97 % | DIASTOLIC BLOOD PRESSURE: 75 MMHG | BODY MASS INDEX: 20.07 KG/M2 | WEIGHT: 132 LBS | SYSTOLIC BLOOD PRESSURE: 134 MMHG

## 2024-12-17 DIAGNOSIS — Z90.13 ACQUIRED ABSENCE OF BILATERAL BREASTS AND NIPPLES: ICD-10-CM

## 2024-12-17 DIAGNOSIS — N65.0 DEFORMITY OF RECONSTRUCTED BREAST: ICD-10-CM

## 2024-12-17 PROCEDURE — 99215 OFFICE O/P EST HI 40 MIN: CPT

## 2025-02-03 ENCOUNTER — NON-APPOINTMENT (OUTPATIENT)
Age: 70
End: 2025-02-03

## 2025-02-03 ENCOUNTER — APPOINTMENT (OUTPATIENT)
Dept: OPHTHALMOLOGY | Facility: CLINIC | Age: 70
End: 2025-02-03
Payer: MEDICARE

## 2025-02-03 PROCEDURE — 92133 CPTRZD OPH DX IMG PST SGM ON: CPT

## 2025-02-03 PROCEDURE — 92083 EXTENDED VISUAL FIELD XM: CPT

## 2025-02-07 ENCOUNTER — NON-APPOINTMENT (OUTPATIENT)
Age: 70
End: 2025-02-07

## 2025-02-07 ENCOUNTER — APPOINTMENT (OUTPATIENT)
Dept: OPHTHALMOLOGY | Facility: CLINIC | Age: 70
End: 2025-02-07
Payer: MEDICARE

## 2025-02-07 PROCEDURE — 76514 ECHO EXAM OF EYE THICKNESS: CPT

## 2025-02-07 PROCEDURE — 92020 GONIOSCOPY: CPT

## 2025-02-07 PROCEDURE — 99213 OFFICE O/P EST LOW 20 MIN: CPT

## 2025-02-13 ENCOUNTER — APPOINTMENT (OUTPATIENT)
Dept: PLASTIC SURGERY | Facility: CLINIC | Age: 70
End: 2025-02-13

## 2025-02-13 VITALS
OXYGEN SATURATION: 97 % | BODY MASS INDEX: 20.46 KG/M2 | HEIGHT: 68 IN | SYSTOLIC BLOOD PRESSURE: 128 MMHG | WEIGHT: 135 LBS | HEART RATE: 90 BPM | TEMPERATURE: 97.8 F | DIASTOLIC BLOOD PRESSURE: 90 MMHG

## 2025-02-13 PROCEDURE — 99214 OFFICE O/P EST MOD 30 MIN: CPT

## 2025-03-26 ENCOUNTER — APPOINTMENT (OUTPATIENT)
Dept: PLASTIC SURGERY | Facility: CLINIC | Age: 70
End: 2025-03-26

## 2025-03-29 ENCOUNTER — APPOINTMENT (OUTPATIENT)
Dept: RADIOLOGY | Facility: CLINIC | Age: 70
End: 2025-03-29
Payer: MEDICARE

## 2025-03-29 PROCEDURE — 73030 X-RAY EXAM OF SHOULDER: CPT | Mod: RT

## 2025-04-08 RX ORDER — OXYCODONE 5 MG/1
5 TABLET ORAL EVERY 6 HOURS
Qty: 5 | Refills: 0 | Status: ACTIVE | COMMUNITY
Start: 2025-04-08 | End: 1900-01-01

## 2025-04-11 ENCOUNTER — TRANSCRIPTION ENCOUNTER (OUTPATIENT)
Age: 70
End: 2025-04-11

## 2025-04-11 ENCOUNTER — RESULT REVIEW (OUTPATIENT)
Age: 70
End: 2025-04-11

## 2025-04-11 ENCOUNTER — APPOINTMENT (OUTPATIENT)
Dept: PLASTIC SURGERY | Facility: HOSPITAL | Age: 70
End: 2025-04-11

## 2025-04-11 PROCEDURE — 15771 GRFG AUTOL FAT LIPO 50 CC/<: CPT

## 2025-04-11 PROCEDURE — 19342 INSJ/RPLCMT BRST IMPLT SEP D: CPT | Mod: 50

## 2025-04-11 PROCEDURE — 15772 GRFG AUTOL FAT LIPO EA ADDL: CPT

## 2025-04-11 PROCEDURE — 19380 REVJ RECONSTRUCTED BREAST: CPT | Mod: 50,59

## 2025-04-12 ENCOUNTER — NON-APPOINTMENT (OUTPATIENT)
Age: 70
End: 2025-04-12

## 2025-04-15 ENCOUNTER — APPOINTMENT (OUTPATIENT)
Dept: PLASTIC SURGERY | Facility: CLINIC | Age: 70
End: 2025-04-15
Payer: MEDICARE

## 2025-04-15 VITALS
WEIGHT: 135 LBS | DIASTOLIC BLOOD PRESSURE: 85 MMHG | BODY MASS INDEX: 20.46 KG/M2 | SYSTOLIC BLOOD PRESSURE: 129 MMHG | HEIGHT: 68 IN

## 2025-04-15 DIAGNOSIS — Z09 ENCOUNTER FOR FOLLOW-UP EXAMINATION AFTER COMPLETED TREATMENT FOR CONDITIONS OTHER THAN MALIGNANT NEOPLASM: ICD-10-CM

## 2025-04-15 PROCEDURE — 99024 POSTOP FOLLOW-UP VISIT: CPT

## 2025-04-17 ENCOUNTER — NON-APPOINTMENT (OUTPATIENT)
Age: 70
End: 2025-04-17

## 2025-04-18 ENCOUNTER — APPOINTMENT (OUTPATIENT)
Dept: ORTHOPEDIC SURGERY | Facility: CLINIC | Age: 70
End: 2025-04-18
Payer: MEDICARE

## 2025-04-18 VITALS — BODY MASS INDEX: 22.02 KG/M2 | WEIGHT: 137 LBS | HEIGHT: 66 IN

## 2025-04-18 DIAGNOSIS — Z78.9 OTHER SPECIFIED HEALTH STATUS: ICD-10-CM

## 2025-04-18 DIAGNOSIS — M25.511 PAIN IN RIGHT SHOULDER: ICD-10-CM

## 2025-04-18 PROCEDURE — 20610 DRAIN/INJ JOINT/BURSA W/O US: CPT | Mod: RT

## 2025-04-18 PROCEDURE — 99204 OFFICE O/P NEW MOD 45 MIN: CPT | Mod: 25

## 2025-04-18 RX ORDER — GABAPENTIN 100 MG/1
100 CAPSULE ORAL
Refills: 0 | Status: ACTIVE | COMMUNITY

## 2025-04-18 RX ORDER — SPIRONOLACTONE 25 MG/1
25 TABLET ORAL
Refills: 0 | Status: ACTIVE | COMMUNITY

## 2025-04-18 RX ORDER — METHYLPREDNISOLONE 4 MG/1
4 TABLET ORAL
Qty: 1 | Refills: 0 | Status: ACTIVE | COMMUNITY
Start: 2025-04-18 | End: 1900-01-01

## 2025-04-18 RX ORDER — CEPHALEXIN 500 MG/1
500 CAPSULE ORAL
Refills: 0 | Status: ACTIVE | COMMUNITY

## 2025-05-05 ENCOUNTER — APPOINTMENT (OUTPATIENT)
Dept: PLASTIC SURGERY | Facility: CLINIC | Age: 70
End: 2025-05-05
Payer: MEDICARE

## 2025-05-05 VITALS
OXYGEN SATURATION: 97 % | DIASTOLIC BLOOD PRESSURE: 76 MMHG | HEART RATE: 80 BPM | SYSTOLIC BLOOD PRESSURE: 122 MMHG | HEIGHT: 66 IN | WEIGHT: 137 LBS | BODY MASS INDEX: 22.02 KG/M2 | TEMPERATURE: 98 F

## 2025-05-05 DIAGNOSIS — Z09 ENCOUNTER FOR FOLLOW-UP EXAMINATION AFTER COMPLETED TREATMENT FOR CONDITIONS OTHER THAN MALIGNANT NEOPLASM: ICD-10-CM

## 2025-05-05 PROCEDURE — 99024 POSTOP FOLLOW-UP VISIT: CPT

## 2025-05-30 ENCOUNTER — APPOINTMENT (OUTPATIENT)
Dept: ORTHOPEDIC SURGERY | Facility: CLINIC | Age: 70
End: 2025-05-30
Payer: MEDICARE

## 2025-05-30 VITALS — BODY MASS INDEX: 22.02 KG/M2 | HEIGHT: 66 IN | WEIGHT: 137 LBS

## 2025-05-30 DIAGNOSIS — M25.511 PAIN IN RIGHT SHOULDER: ICD-10-CM

## 2025-05-30 PROCEDURE — 99214 OFFICE O/P EST MOD 30 MIN: CPT

## 2025-05-30 RX ORDER — MELOXICAM 15 MG/1
15 TABLET ORAL
Qty: 30 | Refills: 1 | Status: ACTIVE | COMMUNITY
Start: 2025-05-30 | End: 1900-01-01

## 2025-06-18 ENCOUNTER — NON-APPOINTMENT (OUTPATIENT)
Age: 70
End: 2025-06-18

## 2025-06-18 RX ORDER — DIAZEPAM 5 MG/1
5 TABLET ORAL
Qty: 2 | Refills: 0 | Status: ACTIVE | COMMUNITY
Start: 2025-06-18 | End: 1900-01-01

## 2025-06-27 ENCOUNTER — RESULT REVIEW (OUTPATIENT)
Age: 70
End: 2025-06-27

## 2025-07-01 ENCOUNTER — APPOINTMENT (OUTPATIENT)
Dept: ORTHOPEDIC SURGERY | Facility: CLINIC | Age: 70
End: 2025-07-01
Payer: MEDICARE

## 2025-07-01 VITALS — HEIGHT: 66 IN | WEIGHT: 137 LBS | BODY MASS INDEX: 22.02 KG/M2

## 2025-07-01 PROBLEM — S46.011D TRAUMATIC COMPLETE TEAR OF RIGHT ROTATOR CUFF, SUBSEQUENT ENCOUNTER: Status: ACTIVE | Noted: 2025-07-01

## 2025-07-01 PROCEDURE — 99214 OFFICE O/P EST MOD 30 MIN: CPT

## 2025-07-01 RX ORDER — METHYLPREDNISOLONE 4 MG/1
4 TABLET ORAL
Qty: 1 | Refills: 0 | Status: ACTIVE | COMMUNITY
Start: 2025-07-01 | End: 1900-01-01

## 2025-07-11 ENCOUNTER — APPOINTMENT (OUTPATIENT)
Dept: ORTHOPEDIC SURGERY | Facility: CLINIC | Age: 70
End: 2025-07-11

## 2025-07-19 ENCOUNTER — NON-APPOINTMENT (OUTPATIENT)
Age: 70
End: 2025-07-19

## 2025-07-21 ENCOUNTER — APPOINTMENT (OUTPATIENT)
Dept: PLASTIC SURGERY | Facility: CLINIC | Age: 70
End: 2025-07-21
Payer: MEDICARE

## 2025-07-21 VITALS
HEIGHT: 66 IN | OXYGEN SATURATION: 96 % | WEIGHT: 135 LBS | HEART RATE: 88 BPM | BODY MASS INDEX: 21.69 KG/M2 | DIASTOLIC BLOOD PRESSURE: 78 MMHG | SYSTOLIC BLOOD PRESSURE: 121 MMHG | TEMPERATURE: 97.5 F

## 2025-07-21 DIAGNOSIS — Z90.13 ACQUIRED ABSENCE OF BILATERAL BREASTS AND NIPPLES: ICD-10-CM

## 2025-07-21 PROCEDURE — 99213 OFFICE O/P EST LOW 20 MIN: CPT

## 2025-09-12 ENCOUNTER — NON-APPOINTMENT (OUTPATIENT)
Age: 70
End: 2025-09-12

## 2025-09-12 ENCOUNTER — APPOINTMENT (OUTPATIENT)
Dept: OPHTHALMOLOGY | Facility: CLINIC | Age: 70
End: 2025-09-12
Payer: MEDICARE

## 2025-09-12 PROCEDURE — 92133 CPTRZD OPH DX IMG PST SGM ON: CPT

## 2025-09-12 PROCEDURE — 92014 COMPRE OPH EXAM EST PT 1/>: CPT
